# Patient Record
(demographics unavailable — no encounter records)

---

## 2024-12-21 NOTE — HISTORY OF PRESENT ILLNESS
[FreeTextEntry6] : Pt here today for c/o ST and feeling "stuffy" since yesterday denies fevers, wheezing or trouble breathing denies n/v/d, cough  good PO  normal activity and sleep

## 2024-12-21 NOTE — DISCUSSION/SUMMARY
[FreeTextEntry1] : - Discussed with family that current strep testing is NEGATIVE. A regular throat culture will be done, with results obtained in 24-28 hours.  If the throat culture is positive, a prescription will be sent to the patients  pharmacy.  If the throat culture is negative after 48 hours and the child is not better, the child should be re-checked.   - Discussed with pt /family the etiology, natural course, possible complications, and treatment options for pharyngitis.  Recommended OTC therapy with pain/fever control products, topical products (lozenges/sprays/gargles) as needed per 's recommendation.

## 2025-07-02 NOTE — DISCUSSION/SUMMARY
[FreeTextEntry1] : Anticipatory guidance and parent education given.  Recommend supportive care including antipyretics, fluids, OTC cough/cold medications if age-appropriate, and nasal saline followed by nasal suction.  Return if symptoms worsen or persist.

## 2025-07-02 NOTE — HISTORY OF PRESENT ILLNESS
[de-identified] : runny nose, dry throat [FreeTextEntry6] : BIB self for runny nose and dry throat in the mornings x4-5 days. No medicine taken today. No fever. No SOB, difficulty breathing, chest pain, cough, congestion or URI sx. No n/v/d. No headache, abdominal pain, sore throat or rash. No body aches or fatigue. Good po/uop/bm. Normal sleep and activity.

## 2025-07-28 NOTE — HISTORY OF PRESENT ILLNESS
[Up to date] : Up to date [At least 1 hour of physical activity a day] : at least 1 hour of physical activity a day [Normal] : normal [Eats meals with family] : eats meals with family [Has family members/adults to turn to for help] : has family members/adults to turn to for help [Sleep Concerns] : no sleep concerns [Eats regular meals including adequate fruits and vegetables] : eats regular meals including adequate fruits and vegetables [Drinks non-sweetened liquids] : drinks non-sweetened liquids  [Calcium source] : calcium source [Has friends] : does not have friends [No] : No cigarette smoke exposure [Yes] : Patient has had sexual intercourse. [Has ways to cope with stress] : has ways to cope with stress [Displays self-confidence] : displays self-confidence [Has problems with sleep] : does not have problems with sleep [Gets depressed, anxious, or irritable/has mood swings] : does not get depressed, anxious, or irritable/has mood swings [Has thought about hurting self or considered suicide] : has not thought about hurting self or considered suicide [With Teen] : teen [de-identified] : self [FreeTextEntry7] : been well [de-identified] : clear  nasal congestion  cough  x 2 wks  did not start Flonase  no fevers  mucus mostly clear  [de-identified] : [facility]  does well  + musical theatre  sings [de-identified] : daily gym

## 2025-07-28 NOTE — PHYSICAL EXAM

## 2025-07-28 NOTE — DISCUSSION/SUMMARY
[FreeTextEntry1] : reviewed CRAFFT screening disc w  pt encourage quitting vaping  Tdap  advised  pt  wants to return for vacc   f/u GYN   optometry    Flonase  q  am  to f/u if purulent nasal dc  or sx  not improving p 1 wk